# Patient Record
(demographics unavailable — no encounter records)

---

## 2025-03-13 NOTE — CONSULT LETTER
[Dear  ___] : Dear  [unfilled], [Courtesy Letter:] : I had the pleasure of seeing your patient, [unfilled], in my office today. [Please see my note below.] : Please see my note below. [Consult Closing:] : Thank you very much for allowing me to participate in the care of this patient.  If you have any questions, please do not hesitate to contact me. [Sincerely,] : Sincerely, [FreeTextEntry2] : Radha Ansari MD\par  [address]\[place] [address] [FreeTextEntry3] : Ashanti Go MD, FCCP\par

## 2025-03-13 NOTE — ASSESSMENT
[FreeTextEntry1] : Data reviewed:  CT chest 11/10/21 NARAYAN personally reviewed : severe emphysema, no cavity, compression fx of spine accounts for inc opacity of spine on lateral images  [facility]orden 10/23/17: severe obstruction, FEV1 37% PFT 2/21/18: mod fixed obstruction, FEV1 67%, %, %, DLCO 54% Jony 2/24/20: obstructed, FEV1 54%  Impression: Severe COPD w emphysema - profoundly dyspneic Has declined LDCT screening, now out of age range  Recommendations: In terms of her dyspnea, we could offer  - Ohtuvayre - she declines this  - Portable ventilator - ie [id number] - she declines this  - Oxygen titration to guide increasing her oxygen with exertion - she declines this Advised her she should aim to keep her sat between 88-92% Advise against long term use of prednisone for her COPD. Not sure who is prescribing this. Advised her to consider a slow taper, ie by 2.5mg/d every 2 weeks to 2.5mg, then 2.5mg qod for 2 weeks, then off, or as directed by prescribing physician. Continue triple tx w Trelegy. Vax recommendations discussed - rec RSV and rec annual flu shot. Advise trying to get some physical activity, though rehab doesn't seem to be available where she lives.

## 2025-03-13 NOTE — PHYSICAL EXAM
[No Acute Distress] : no acute distress [Normal Rate/Rhythm] : normal rate/rhythm [Normal S1, S2] : normal s1, s2 [No Resp Distress] : no resp distress [Clear to Auscultation Bilaterally] : clear to auscultation bilaterally [TextBox_2] : 96% on Inogen at 2, by me

## 2025-03-13 NOTE — HISTORY OF PRESENT ILLNESS
[Former] : former [TextBox_4] : My patient since 2017, referred by Dr Ansari for COPD, on triple tx, and lives in [place]. Quit smoking 2017. Has ambulatory oxygen due to exertional desaturation. Chronically very dyspneic, chronically sedentary.  8/11/2023: Comes in from [place], clinically stable. Remains dyspneic on exertion. Not using her Inogen; is not working, but even when it was functional it did not seem to her to help. Is on triple tx w Symbicort and Spiriva. Is doing PT. Has not had any serious interval illnesses.  3/13/2025: Using her ambulatory O2 ATC at 2LPM and overnight. Dyspneic all the time whenever she does anything. Fine at rest. Mainly sedentary. Lives [place] [address] in [place]. Has a treadmill but hasn't used it. No sudden change but gradual worsening of symptoms. Taking Trelegy now. Taking 10mg prednisone every day. Was hospitalized about a year ago - for what is unclear - and the prednisone was started then. [YearQuit] : 2017